# Patient Record
Sex: FEMALE | Race: BLACK OR AFRICAN AMERICAN | NOT HISPANIC OR LATINO | URBAN - METROPOLITAN AREA
[De-identification: names, ages, dates, MRNs, and addresses within clinical notes are randomized per-mention and may not be internally consistent; named-entity substitution may affect disease eponyms.]

---

## 2017-03-16 ENCOUNTER — FOLLOW UP ESTABLISHED (OUTPATIENT)
Dept: URBAN - METROPOLITAN AREA CLINIC 10 | Facility: CLINIC | Age: 37
End: 2017-03-16
Payer: COMMERCIAL

## 2017-03-16 DIAGNOSIS — Z94.7 CORNEAL TRANSPLANT STATUS: Primary | ICD-10-CM

## 2017-03-16 PROCEDURE — 65222 REMOVE FOREIGN BODY FROM EYE: CPT | Performed by: OPHTHALMOLOGY

## 2017-03-16 PROCEDURE — 92025 CPTRIZED CORNEAL TOPOGRAPHY: CPT | Performed by: OPHTHALMOLOGY

## 2017-03-16 PROCEDURE — 92012 INTRM OPH EXAM EST PATIENT: CPT | Performed by: OPHTHALMOLOGY

## 2017-03-16 RX ORDER — FLUOROMETHOLONE 1 MG/ML
0.1 % SOLUTION/ DROPS OPHTHALMIC
Qty: 10 | Refills: 3 | Status: INACTIVE
Start: 2017-03-16 | End: 2017-11-27

## 2017-03-16 RX ORDER — PREDNISOLONE ACETATE 10 MG/ML
1 % SUSPENSION/ DROPS OPHTHALMIC
Qty: 10 | Refills: 3 | Status: INACTIVE
Start: 2017-03-16 | End: 2017-08-07

## 2017-03-16 RX ORDER — PREDNISOLONE ACETATE 10 MG/ML
1 % SUSPENSION/ DROPS OPHTHALMIC
Qty: 10 | Refills: 3 | Status: INACTIVE
Start: 2017-03-16 | End: 2019-12-12

## 2017-03-16 ASSESSMENT — INTRAOCULAR PRESSURE
OS: 14
OD: 13

## 2017-03-16 ASSESSMENT — VISUAL ACUITY: OS: 20/25

## 2019-03-06 ENCOUNTER — FOLLOW UP ESTABLISHED (OUTPATIENT)
Dept: URBAN - METROPOLITAN AREA CLINIC 56 | Facility: CLINIC | Age: 39
End: 2019-03-06
Payer: MEDICARE

## 2019-03-06 DIAGNOSIS — L03.213 PERIORBITAL CELLULITIS: Primary | ICD-10-CM

## 2019-03-06 PROCEDURE — 92014 COMPRE OPH EXAM EST PT 1/>: CPT | Performed by: OPTOMETRIST

## 2019-03-06 RX ORDER — CEPHALEXIN 500 MG/1
500 MG CAPSULE ORAL
Qty: 20 | Refills: 0 | Status: INACTIVE
Start: 2019-03-06 | End: 2019-12-12

## 2019-03-06 ASSESSMENT — VISUAL ACUITY
OD: 20/20
OS: 20/25

## 2019-03-06 ASSESSMENT — INTRAOCULAR PRESSURE
OS: 21
OD: 20

## 2019-03-06 ASSESSMENT — KERATOMETRY
OS: 43.63
OD: 46.83

## 2019-12-12 ENCOUNTER — FOLLOW UP ESTABLISHED (OUTPATIENT)
Dept: URBAN - METROPOLITAN AREA CLINIC 51 | Facility: CLINIC | Age: 39
End: 2019-12-12
Payer: MEDICARE

## 2019-12-12 PROCEDURE — 92012 INTRM OPH EXAM EST PATIENT: CPT | Performed by: OPTOMETRIST

## 2019-12-12 ASSESSMENT — INTRAOCULAR PRESSURE
OD: 15
OS: 18

## 2019-12-13 ENCOUNTER — FOLLOW UP ESTABLISHED (OUTPATIENT)
Dept: URBAN - METROPOLITAN AREA CLINIC 24 | Facility: CLINIC | Age: 39
End: 2019-12-13
Payer: MEDICARE

## 2019-12-13 PROCEDURE — 76514 ECHO EXAM OF EYE THICKNESS: CPT | Performed by: OPHTHALMOLOGY

## 2019-12-13 PROCEDURE — 92014 COMPRE OPH EXAM EST PT 1/>: CPT | Performed by: OPHTHALMOLOGY

## 2019-12-13 PROCEDURE — 92025 CPTRIZED CORNEAL TOPOGRAPHY: CPT | Performed by: OPHTHALMOLOGY

## 2019-12-13 PROCEDURE — 92012 INTRM OPH EXAM EST PATIENT: CPT | Performed by: OPHTHALMOLOGY

## 2019-12-13 RX ORDER — FLUOROMETHOLONE 1 MG/ML
0.1 % SOLUTION/ DROPS OPHTHALMIC
Qty: 1 | Refills: 6 | Status: INACTIVE
Start: 2019-12-13 | End: 2020-01-07

## 2019-12-13 ASSESSMENT — VISUAL ACUITY
OD: 20/25
OS: 20/30

## 2019-12-13 ASSESSMENT — INTRAOCULAR PRESSURE
OD: 19
OS: 14

## 2021-05-25 ENCOUNTER — OFFICE VISIT (OUTPATIENT)
Dept: URBAN - METROPOLITAN AREA CLINIC 44 | Facility: CLINIC | Age: 41
End: 2021-05-25
Payer: MEDICARE

## 2021-05-25 PROCEDURE — 99214 OFFICE O/P EST MOD 30 MIN: CPT | Performed by: OPTOMETRIST

## 2021-05-25 PROCEDURE — 92025 CPTRIZED CORNEAL TOPOGRAPHY: CPT | Performed by: OPTOMETRIST

## 2021-05-25 PROCEDURE — 92134 CPTRZ OPH DX IMG PST SGM RTA: CPT | Performed by: OPTOMETRIST

## 2021-05-25 ASSESSMENT — VISUAL ACUITY
OD: 20/25
OS: 20/40

## 2021-05-25 ASSESSMENT — INTRAOCULAR PRESSURE
OS: 23
OD: 19

## 2021-05-25 ASSESSMENT — KERATOMETRY
OD: 46.25
OS: 42.75

## 2021-05-25 NOTE — IMPRESSION/PLAN
Impression: Bilateral keratoconus - s/p PKP OU Plan: Patient noticed gray spot on her cornea OS last week. Today, there appears to be new central/paracentral corneal haze with mildly reduced vision. Increase FML to q1h.   See Dr. Joselito Piña tomorrow in [facility].

## 2021-05-26 ENCOUNTER — OFFICE VISIT (OUTPATIENT)
Dept: URBAN - METROPOLITAN AREA CLINIC 44 | Facility: CLINIC | Age: 41
End: 2021-05-26
Payer: MEDICARE

## 2021-05-26 DIAGNOSIS — H18.603 BILATERAL KERATOCONUS: ICD-10-CM

## 2021-05-26 PROCEDURE — 92025 CPTRIZED CORNEAL TOPOGRAPHY: CPT | Performed by: OPHTHALMOLOGY

## 2021-05-26 PROCEDURE — 99214 OFFICE O/P EST MOD 30 MIN: CPT | Performed by: OPHTHALMOLOGY

## 2021-05-26 PROCEDURE — 76514 ECHO EXAM OF EYE THICKNESS: CPT | Performed by: OPHTHALMOLOGY

## 2021-05-26 RX ORDER — PREDNISOLONE ACETATE 10 MG/ML
1 % SUSPENSION/ DROPS OPHTHALMIC
Qty: 5 | Refills: 3 | Status: ACTIVE
Start: 2021-05-26

## 2021-05-26 RX ORDER — OFLOXACIN 3 MG/ML
0.3 % SOLUTION/ DROPS OPHTHALMIC
Qty: 5 | Refills: 1 | Status: ACTIVE
Start: 2021-05-26

## 2021-05-26 ASSESSMENT — INTRAOCULAR PRESSURE
OS: 18
OD: 14

## 2021-05-26 NOTE — IMPRESSION/PLAN
Impression: Corneal transplant rejection, left eye: O72.1779. Plan: Dt loose/broken sutures, several removed today, oflox placed D/c FML OS Start oflox and pred QID OS. Will plan to remove remaining sutures. 
Will have optom check IOP in 1 week, Dr. Abigail Aguayo 2 weeks

## 2021-05-26 NOTE — IMPRESSION/PLAN
Impression: Bilateral keratoconus - s/p PKP OU, OD ~2005, OS 2016 Plan: Cont FML OD. 
See above for plan OS

## 2021-06-08 ENCOUNTER — OFFICE VISIT (OUTPATIENT)
Dept: URBAN - METROPOLITAN AREA CLINIC 44 | Facility: CLINIC | Age: 41
End: 2021-06-08
Payer: MEDICARE

## 2021-06-08 DIAGNOSIS — T86.8402 CORNEAL TRANSPLANT REJECTION, LEFT EYE: Primary | ICD-10-CM

## 2021-06-08 PROCEDURE — 99213 OFFICE O/P EST LOW 20 MIN: CPT | Performed by: OPHTHALMOLOGY

## 2021-06-08 ASSESSMENT — INTRAOCULAR PRESSURE
OS: 19
OD: 19

## 2021-06-08 NOTE — IMPRESSION/PLAN
Impression: Corneal transplant rejection, left eye: X59.0174. Plan: Slowly improving, no broken sutures noted. D/c oflox OS Cont pred QID OS, will slowly taper Hold off on further suture removal for now.

## 2021-07-06 ENCOUNTER — OFFICE VISIT (OUTPATIENT)
Dept: URBAN - METROPOLITAN AREA CLINIC 44 | Facility: CLINIC | Age: 41
End: 2021-07-06
Payer: COMMERCIAL

## 2021-07-06 PROCEDURE — 92012 INTRM OPH EXAM EST PATIENT: CPT | Performed by: OPHTHALMOLOGY

## 2021-07-06 ASSESSMENT — INTRAOCULAR PRESSURE
OD: 21
OS: 19

## 2021-07-06 NOTE — IMPRESSION/PLAN
Impression: Corneal transplant rejection, left eye: X34.5854. Plan: Slowly improving, no broken sutures noted. Decrease pred to TID OS, will slowly taper. IOPs adequate Hold off on further suture removal for now.

## 2023-03-21 ENCOUNTER — OFFICE VISIT (OUTPATIENT)
Dept: URBAN - METROPOLITAN AREA CLINIC 44 | Facility: CLINIC | Age: 43
End: 2023-03-21
Payer: COMMERCIAL

## 2023-03-21 DIAGNOSIS — H18.603 BILATERAL KERATOCONUS: ICD-10-CM

## 2023-03-21 DIAGNOSIS — H52.13 MYOPIA, BILATERAL: ICD-10-CM

## 2023-03-21 DIAGNOSIS — T86.8402 CORNEAL TRANSPLANT REJECTION, LEFT EYE: Primary | ICD-10-CM

## 2023-03-21 DIAGNOSIS — R73.03 PREDIABETES: ICD-10-CM

## 2023-03-21 PROCEDURE — 99214 OFFICE O/P EST MOD 30 MIN: CPT | Performed by: OPTOMETRIST

## 2023-03-21 RX ORDER — PREDNISOLONE ACETATE 10 MG/ML
1 % SUSPENSION/ DROPS OPHTHALMIC
Qty: 5 | Refills: 3 | Status: ACTIVE
Start: 2023-03-21

## 2023-03-21 ASSESSMENT — INTRAOCULAR PRESSURE
OD: 20
OS: 22

## 2023-03-21 ASSESSMENT — VISUAL ACUITY
OS: 20/40
OD: 20/25

## 2023-03-21 ASSESSMENT — KERATOMETRY
OD: 47.00
OS: 43.00

## 2023-03-21 NOTE — IMPRESSION/PLAN
Impression: Bilateral keratoconus - s/p PKP OU, OD ~2005, OS 2016. No signs of rejection OD.  Hx of rejection OS but stable Plan: See [id number]

## 2023-03-21 NOTE — IMPRESSION/PLAN
Impression: Prediabetes: R73.03. No vascular abnormalities Plan: PLAN: Stressed importance of regular follow ups with PCP. Discussed importance to maintaining A1C at 7.0 or below. Send report to PCP. RTC 12 months for complete and OCT mac.  OPTOS (If DM for >10yrs)

## 2023-03-21 NOTE — IMPRESSION/PLAN
Impression: Corneal transplant rejection, left eye: J14.5880. -Appears stable. -Mild IOP spike Plan: PLAN: Discussed findings. Rec taper Pred Forte. Reduce to QD OU. After 4 weeks D/C OD. Continue QD OS.  RTC 6 months for short and IOP check

## 2024-02-28 ENCOUNTER — OFFICE VISIT (OUTPATIENT)
Dept: URBAN - METROPOLITAN AREA CLINIC 44 | Facility: CLINIC | Age: 44
End: 2024-02-28
Payer: MEDICAID

## 2024-02-28 DIAGNOSIS — H18.603 BILATERAL KERATOCONUS: ICD-10-CM

## 2024-02-28 DIAGNOSIS — H16.142 PUNCTATE KERATITIS OF LEFT EYE: Primary | ICD-10-CM

## 2024-02-28 PROCEDURE — 92012 INTRM OPH EXAM EST PATIENT: CPT | Performed by: OPTOMETRIST

## 2024-02-28 RX ORDER — PREDNISOLONE ACETATE 10 MG/ML
1 % SUSPENSION/ DROPS OPHTHALMIC
Qty: 5 | Refills: 3 | Status: ACTIVE
Start: 2024-02-28

## 2024-02-28 ASSESSMENT — INTRAOCULAR PRESSURE
OD: 18
OS: 18

## 2024-08-15 ENCOUNTER — OFFICE VISIT (OUTPATIENT)
Dept: URBAN - METROPOLITAN AREA CLINIC 44 | Facility: CLINIC | Age: 44
End: 2024-08-15
Payer: MEDICAID

## 2024-08-15 DIAGNOSIS — H43.393 OTHER VITREOUS OPACITIES, BILATERAL: ICD-10-CM

## 2024-08-15 DIAGNOSIS — H52.13 MYOPIA, BILATERAL: ICD-10-CM

## 2024-08-15 DIAGNOSIS — H57.11 OCULAR PAIN, RIGHT EYE: ICD-10-CM

## 2024-08-15 DIAGNOSIS — H18.603 BILATERAL KERATOCONUS: Primary | ICD-10-CM

## 2024-08-15 PROCEDURE — 92134 CPTRZ OPH DX IMG PST SGM RTA: CPT | Performed by: OPTOMETRIST

## 2024-08-15 PROCEDURE — 92014 COMPRE OPH EXAM EST PT 1/>: CPT | Performed by: OPTOMETRIST

## 2024-08-15 ASSESSMENT — INTRAOCULAR PRESSURE
OS: 21
OS: 25
OD: 25
OD: 20

## 2024-08-15 ASSESSMENT — VISUAL ACUITY
OS: 20/20
OD: 20/25

## 2024-08-15 ASSESSMENT — KERATOMETRY
OS: 44.25
OD: 47.25

## 2025-07-02 ENCOUNTER — OFFICE VISIT (OUTPATIENT)
Dept: URBAN - METROPOLITAN AREA CLINIC 44 | Facility: CLINIC | Age: 45
End: 2025-07-02
Payer: COMMERCIAL

## 2025-07-02 DIAGNOSIS — H18.603 BILATERAL KERATOCONUS: Primary | ICD-10-CM

## 2025-07-02 PROCEDURE — 92012 INTRM OPH EXAM EST PATIENT: CPT | Performed by: OPTOMETRIST

## 2025-07-02 RX ORDER — PREDNISOLONE ACETATE 10 MG/ML
1 % SUSPENSION/ DROPS OPHTHALMIC
Qty: 5 | Refills: 3 | Status: ACTIVE
Start: 2025-07-02

## 2025-07-02 ASSESSMENT — INTRAOCULAR PRESSURE
OS: 22
OD: 18